# Patient Record
Sex: FEMALE | Race: WHITE | NOT HISPANIC OR LATINO | Employment: UNEMPLOYED | URBAN - NONMETROPOLITAN AREA
[De-identification: names, ages, dates, MRNs, and addresses within clinical notes are randomized per-mention and may not be internally consistent; named-entity substitution may affect disease eponyms.]

---

## 2022-05-24 ENCOUNTER — PATIENT ROUNDING (BHMG ONLY) (OUTPATIENT)
Dept: CARDIOLOGY | Facility: CLINIC | Age: 32
End: 2022-05-24

## 2022-05-24 ENCOUNTER — OFFICE VISIT (OUTPATIENT)
Dept: CARDIOLOGY | Facility: CLINIC | Age: 32
End: 2022-05-24

## 2022-05-24 VITALS
WEIGHT: 189.6 LBS | HEART RATE: 86 BPM | DIASTOLIC BLOOD PRESSURE: 78 MMHG | BODY MASS INDEX: 30.47 KG/M2 | HEIGHT: 66 IN | SYSTOLIC BLOOD PRESSURE: 116 MMHG | OXYGEN SATURATION: 99 %

## 2022-05-24 DIAGNOSIS — E55.9 VITAMIN D DEFICIENCY: ICD-10-CM

## 2022-05-24 DIAGNOSIS — R06.09 DYSPNEA ON EXERTION: ICD-10-CM

## 2022-05-24 DIAGNOSIS — Z78.9: Primary | ICD-10-CM

## 2022-05-24 DIAGNOSIS — I47.1 PAROXYSMAL SVT (SUPRAVENTRICULAR TACHYCARDIA): ICD-10-CM

## 2022-05-24 DIAGNOSIS — R73.9 HYPERGLYCEMIA: ICD-10-CM

## 2022-05-24 DIAGNOSIS — R07.89 CHEST PAIN, ATYPICAL: ICD-10-CM

## 2022-05-24 DIAGNOSIS — Z13.220 LIPID SCREENING: ICD-10-CM

## 2022-05-24 DIAGNOSIS — R00.2 PALPITATIONS: ICD-10-CM

## 2022-05-24 PROCEDURE — 99204 OFFICE O/P NEW MOD 45 MIN: CPT | Performed by: NURSE PRACTITIONER

## 2022-05-24 NOTE — PROGRESS NOTES
Subjective     Chief Complaint   Patient presents with   • Establish Care     Cardiac management-Patient states she has been having chest pain that radiates down her left shoulder and arm. Mostly it is intermittent, but for the last 3 days it has been consistent.   She was seen at [facility] on 2022 for this chest pain.  Patient reports having palpitations off and on all day. She gets dizzy, flush and breaks out in a sweat. This happens when she is at any time. She also reports being SOA.   • Labs     Patient had labs at [facility] on 2022. She brought a copy in with her today.   • Med Refill     Patient is not on any current medications.     Initial cardiac evaluation;    HPI   Dulce Junior is a 31-year-old female who presents today for initial cardiac evaluation.  She has past cardiac history positive for SVT .  According to her, she was treated in [facility] ER in 2017 for heart rate of 225.  Treated with Cardizem.  She did not follow-up with anyone after this. She also has history of drug addiction, primarily methamphetamine/multiple substances, has been clean for 5 years.  She established primary care with Dr. Estrada last month.  She recently has noticed increasing chest pressure which is intermittent not associated with any particular activity. Pain radiates to left arm.  No nausea but does admit to diaphoresis, dizziness.  She also has palpitations off and on throughout the day.  Her heart races for a few seconds then stops.  She was seen in [facility] ER on 2022 with chest pain.  Troponin negative, H/H 12.1/37.9 otherwise normal CBC, D-dimer normal, potassium 3.3, glucose 154, normal BUN/CR, magnesium 1.9.  EKG reported as normal.  As stated, she quit using drugs 5 years ago.  Does not drink alcohol.  Quit smoking 3 years ago.  She vapes daily.  Family history positive for arrhythmia in her mother.  No early CAD.     Cardiac History  Past Surgical History:   Procedure Laterality Date   •  SECTION      x2  "    No current outpatient medications on file.     No current facility-administered medications for this visit.     Patient has no known allergies.    Past Medical History:   Diagnosis Date   • Allergic rhinitis    • Anxiety    • Chest pain    • Hepatitis C    • Irritable bowel syndrome      Social History     Socioeconomic History   • Marital status: Single   Tobacco Use   • Smoking status: Former Smoker     Packs/day: 1.00     Years: 10.00     Pack years: 10.00     Types: Cigarettes     Quit date: 1/1/2019     Years since quitting: 3.4   • Smokeless tobacco: Never Used   Vaping Use   • Vaping Use: Every day   • Substances: Nicotine, Flavoring   • Devices: Refillable tank   Substance and Sexual Activity   • Alcohol use: Not Currently   • Drug use: Not Currently   • Sexual activity: Defer     Family History   Problem Relation Age of Onset   • Arrhythmia Mother    • No Known Problems Father    • No Known Problems Sister    • No Known Problems Brother    • Cancer Maternal Grandfather    • Cancer Paternal Grandmother    • Cancer Paternal Grandfather      Review of Systems   Constitutional: Positive for activity change and fatigue.   HENT: Negative.    Eyes: Negative.    Respiratory: Positive for chest tightness and shortness of breath.    Cardiovascular: Positive for chest pain and palpitations.   Gastrointestinal: Negative.    Endocrine: Negative.    Genitourinary: Negative.    Musculoskeletal: Negative.    Skin: Negative.    Allergic/Immunologic: Negative.    Neurological: Positive for dizziness.   Hematological: Negative.    Psychiatric/Behavioral: Negative.      Diabetes- No  Thyroid- normal    Objective     /78 (BP Location: Right arm, Patient Position: Sitting, Cuff Size: Adult)   Pulse 86   Ht 167.6 cm (66\")   Wt 86 kg (189 lb 9.6 oz)   SpO2 99%   BMI 30.60 kg/m²     Physical Exam  Vitals and nursing note reviewed.   Constitutional:       Appearance: Normal appearance.   HENT:      Head: Normocephalic " and atraumatic.      Right Ear: External ear normal.      Left Ear: External ear normal.   Eyes:      General: No scleral icterus.     Pupils: Pupils are equal, round, and reactive to light.   Cardiovascular:      Rate and Rhythm: Normal rate and regular rhythm.      Pulses: Normal pulses.      Heart sounds: Murmur heard.   Pulmonary:      Effort: Pulmonary effort is normal. No respiratory distress.      Breath sounds: Normal breath sounds.   Abdominal:      General: Abdomen is flat. Bowel sounds are normal. There is no distension.      Palpations: Abdomen is soft.   Musculoskeletal:         General: Normal range of motion.      Cervical back: Normal range of motion.   Skin:     General: Skin is warm and dry.      Capillary Refill: Capillary refill takes less than 2 seconds.   Neurological:      General: No focal deficit present.      Mental Status: She is alert and oriented to person, place, and time.   Psychiatric:         Mood and Affect: Mood normal.         Behavior: Behavior normal.         ECG 12 Lead    Date/Time: 5/26/2022 11:13 AM  Performed by: Iliana Black APRN  Authorized by: Iliana Black APRN   Comparison: not compared with previous ECG   Previous ECG: no previous ECG available  Rhythm: sinus rhythm  Rate: normal  BPM: 81  Conduction: incomplete right bundle branch block  ST Segments: ST segments normal    Clinical impression: non-specific ECG  Comments:  ms  QRS 93 ms  QTc 397 ms            Assessment & Plan     Diagnoses and all orders for this visit:    1. Quit drug use in remote past (Primary)  -     Treadmill Stress Test; Future  -     Adult Transthoracic Echo Complete W/ Cont if Necessary Per Protocol; Future    2. Chest pain, atypical  -     Treadmill Stress Test; Future  -     Adult Transthoracic Echo Complete W/ Cont if Necessary Per Protocol; Future  -     TSH; Future  -     High Sensitivity CRP; Future    3. Palpitations  -     Treadmill Stress Test; Future  -     Adult Transthoracic  Echo Complete W/ Cont if Necessary Per Protocol; Future  -     Holter Monitor - 72 Hour Up To 15 Days; Future  -     TSH; Future    4. Dyspnea on exertion  -     Treadmill Stress Test; Future  -     Adult Transthoracic Echo Complete W/ Cont if Necessary Per Protocol; Future  -     TSH; Future  -     High Sensitivity CRP; Future    5. Lipid screening  -     Lipid Panel; Future  -     High Sensitivity CRP; Future    6. Paroxysmal SVT (supraventricular tachycardia) (HCC)  -     TSH; Future    7. Vitamin D deficiency  -     Vitamin D 25 Hydroxy; Future    8. Hyperglycemia  -     Hemoglobin A1c; Future    Other orders  -     ECG 12 Lead       Clinical exam reveals normal S1, S2 with soft holosystolic murmur at the mitral area, no gallop, no rub.  Peripheral pulses normal, no edema, lungs clear bilaterally.  EKG showed sinus rhythm at 81 bpm, incomplete right bundle branch block.    Blood pressure and heart rate are normal.  BMI elevated at 30.6.  Heart healthy diet encouraged.  Mediterranean diet sheet given.    Palpitations/questionable SVT will be evaluated with Holter monitor.  We will check TSH.  Magnesium normal, CBC showed borderline anemia.  Limit caffeine, try to reduce nicotine in the vape.  Adequate water intake.  Based on the results, she may need EP referral versus resuming Cardizem or beta-blocker.  She would like to wait for results before starting medication.    Symptoms of chest pain and dyspnea need to be evaluated with echo and stress test.  Echocardiogram to evaluate murmur, chamber size, LVEF, PA pressure and rule out pericardial effusion.  Regular treadmill stress test to evaluate exercise tolerance, blood pressure response, rule out stress-induced ischemia or arrhythmia.    Check lipids and high-sensitivity CRP for screening.  Check A1c as her glucose was elevated at 154.  Will check vitamin D also.    Further recommendations to follow Holter, echo, stress and labs.  We will see her back in 6 months  or sooner as needed.  She was congratulated on her 5 years of sobriety and maintaining smoking cessation.

## 2022-05-24 NOTE — PROGRESS NOTES
May 24, 2022    Hel, may I speak with Dulce Junior?    My name is Vandana Winters        I am  with [facility]  [facility]  [address] [place] [id number] [phone].    Before we get started may I verify your date of birth? 1990    I am calling to officially welcome you to our practice and ask about your recent visit. Is this a good time to talk? yes    Tell me about your visit with us. What things went well? Everything went great        We're always looking for ways to make our patients' experiences even better. Do you have recommendations on ways we may improve?  not-really , but the  stated he would have like to have had some chocolate milk--then laughed     Overall were you satisfied with your first visit to our practice? yes       I appreciate you taking the time to speak with me today. Is there anything else I can do for you? No not at all       Thank you, and have a great day.

## 2022-05-26 PROCEDURE — 93000 ELECTROCARDIOGRAM COMPLETE: CPT | Performed by: NURSE PRACTITIONER

## 2022-06-13 ENCOUNTER — HOSPITAL ENCOUNTER (OUTPATIENT)
Dept: CARDIOLOGY | Facility: HOSPITAL | Age: 32
Discharge: HOME | End: 2022-06-13

## 2022-06-13 ENCOUNTER — LAB (OUTPATIENT)
Dept: LAB | Facility: HOSPITAL | Age: 32
End: 2022-06-13

## 2022-06-13 VITALS — BODY MASS INDEX: 30.47 KG/M2 | HEIGHT: 66 IN | WEIGHT: 189.6 LBS

## 2022-06-13 DIAGNOSIS — R06.09 DYSPNEA ON EXERTION: ICD-10-CM

## 2022-06-13 DIAGNOSIS — R07.89 CHEST PAIN, ATYPICAL: ICD-10-CM

## 2022-06-13 DIAGNOSIS — R73.9 HYPERGLYCEMIA: ICD-10-CM

## 2022-06-13 DIAGNOSIS — E55.9 VITAMIN D DEFICIENCY: ICD-10-CM

## 2022-06-13 DIAGNOSIS — Z13.220 LIPID SCREENING: ICD-10-CM

## 2022-06-13 DIAGNOSIS — R00.2 PALPITATIONS: ICD-10-CM

## 2022-06-13 DIAGNOSIS — Z78.9: ICD-10-CM

## 2022-06-13 DIAGNOSIS — I47.1 PAROXYSMAL SVT (SUPRAVENTRICULAR TACHYCARDIA): ICD-10-CM

## 2022-06-13 LAB
AORTIC DIMENSIONLESS INDEX: 0.93 (DI)
BH CV ECHO MEAS - ACS: 1.64 CM
BH CV ECHO MEAS - AO MAX PG: 4.9 MMHG
BH CV ECHO MEAS - AO MEAN PG: 2.8 MMHG
BH CV ECHO MEAS - AO ROOT DIAM: 3 CM
BH CV ECHO MEAS - AO V2 MAX: 111.1 CM/SEC
BH CV ECHO MEAS - AO V2 VTI: 25.5 CM
BH CV ECHO MEAS - EDV(CUBED): 99 ML
BH CV ECHO MEAS - EF_3D-VOL: 61 %
BH CV ECHO MEAS - ESV(CUBED): 29.8 ML
BH CV ECHO MEAS - FS: 33 %
BH CV ECHO MEAS - IVS/LVPW: 0.98 CM
BH CV ECHO MEAS - IVSD: 0.75 CM
BH CV ECHO MEAS - LA DIMENSION: 3.5 CM
BH CV ECHO MEAS - LAT PEAK E' VEL: 12.7 CM/SEC
BH CV ECHO MEAS - LV MASS(C)D: 111 GRAMS
BH CV ECHO MEAS - LV MAX PG: 4.3 MMHG
BH CV ECHO MEAS - LV MEAN PG: 1.86 MMHG
BH CV ECHO MEAS - LV V1 MAX: 104 CM/SEC
BH CV ECHO MEAS - LV V1 VTI: 24.4 CM
BH CV ECHO MEAS - LVIDD: 4.6 CM
BH CV ECHO MEAS - LVIDS: 3.1 CM
BH CV ECHO MEAS - LVPWD: 0.76 CM
BH CV ECHO MEAS - MED PEAK E' VEL: 12.4 CM/SEC
BH CV ECHO MEAS - MV A MAX VEL: 44.5 CM/SEC
BH CV ECHO MEAS - MV DEC SLOPE: 553.6 CM/SEC2
BH CV ECHO MEAS - MV DEC TIME: 0.27 MSEC
BH CV ECHO MEAS - MV E MAX VEL: 75.9 CM/SEC
BH CV ECHO MEAS - MV E/A: 1.7
BH CV ECHO MEAS - MV MAX PG: 4.2 MMHG
BH CV ECHO MEAS - MV MEAN PG: 1.64 MMHG
BH CV ECHO MEAS - MV P1/2T: 59.8 MSEC
BH CV ECHO MEAS - MV V2 VTI: 28.7 CM
BH CV ECHO MEAS - MVA(P1/2T): 3.7 CM2
BH CV ECHO MEAS - PA V2 MAX: 104.3 CM/SEC
BH CV ECHO MEAS - RAP SYSTOLE: 10 MMHG
BH CV ECHO MEAS - RV MAX PG: 3.6 MMHG
BH CV ECHO MEAS - RV V1 MAX: 95.1 CM/SEC
BH CV ECHO MEAS - RV V1 VTI: 22.2 CM
BH CV ECHO MEAS - RVDD: 2.8 CM
BH CV ECHO MEAS - RVSP: 22.5 MMHG
BH CV ECHO MEAS - TAPSE (>1.6): 2.48 CM
BH CV ECHO MEAS - TR MAX PG: 12.5 MMHG
BH CV ECHO MEAS - TR MAX VEL: 176.8 CM/SEC
BH CV ECHO MEASUREMENTS AVERAGE E/E' RATIO: 6.05
BH CV STRESS DURATION MIN STAGE 1: 3
BH CV STRESS DURATION SEC STAGE 1: 0
BH CV STRESS GRADE STAGE 1: 10
BH CV STRESS METS STAGE 1: 5
BH CV STRESS PROTOCOL 1: NORMAL
BH CV STRESS RECOVERY BP: NORMAL MMHG
BH CV STRESS RECOVERY HR: 96 BPM
BH CV STRESS SPEED STAGE 1: 1.7
BH CV STRESS STAGE 1: 1
BH CV XLRA - TDI S': 14.1 CM/SEC
CHOLEST SERPL-MCNC: 169 MG/DL (ref 0–200)
HBA1C MFR BLD: 4.7 % (ref 4.8–5.6)
HDLC SERPL-MCNC: 57 MG/DL (ref 40–60)
IVRT: 98 MSEC
LDLC SERPL CALC-MCNC: 98 MG/DL (ref 0–100)
LDLC/HDLC SERPL: 1.7 {RATIO}
MAXIMAL PREDICTED HEART RATE: 191 BPM
MAXIMAL PREDICTED HEART RATE: 191 BPM
PERCENT MAX PREDICTED HR: 84.82 %
SINUS: 2.7 CM
STRESS BASELINE BP: NORMAL MMHG
STRESS BASELINE HR: 88 BPM
STRESS PERCENT HR: 100 %
STRESS POST ESTIMATED WORKLOAD: 10.1 METS
STRESS POST EXERCISE DUR MIN: 7 MIN
STRESS POST EXERCISE DUR SEC: 15 SEC
STRESS POST PEAK BP: NORMAL MMHG
STRESS POST PEAK HR: 162 BPM
STRESS TARGET HR: 162 BPM
STRESS TARGET HR: 162 BPM
TRIGL SERPL-MCNC: 76 MG/DL (ref 0–150)
TSH SERPL DL<=0.05 MIU/L-ACNC: 0.92 UIU/ML (ref 0.27–4.2)
VLDLC SERPL-MCNC: 14 MG/DL (ref 5–40)

## 2022-06-13 PROCEDURE — 93306 TTE W/DOPPLER COMPLETE: CPT | Performed by: INTERNAL MEDICINE

## 2022-06-13 PROCEDURE — 80061 LIPID PANEL: CPT

## 2022-06-13 PROCEDURE — 82306 VITAMIN D 25 HYDROXY: CPT

## 2022-06-13 PROCEDURE — 36415 COLL VENOUS BLD VENIPUNCTURE: CPT

## 2022-06-13 PROCEDURE — 93017 CV STRESS TEST TRACING ONLY: CPT

## 2022-06-13 PROCEDURE — 93306 TTE W/DOPPLER COMPLETE: CPT

## 2022-06-13 PROCEDURE — 93018 CV STRESS TEST I&R ONLY: CPT | Performed by: INTERNAL MEDICINE

## 2022-06-13 PROCEDURE — 83036 HEMOGLOBIN GLYCOSYLATED A1C: CPT

## 2022-06-13 PROCEDURE — 86141 C-REACTIVE PROTEIN HS: CPT

## 2022-06-13 PROCEDURE — 84443 ASSAY THYROID STIM HORMONE: CPT

## 2022-06-14 LAB
25(OH)D3 SERPL-MCNC: 38.1 NG/ML (ref 30–100)
CRP SERPL-MCNC: 0.17 MG/DL (ref 0.01–0.5)